# Patient Record
Sex: MALE | Race: BLACK OR AFRICAN AMERICAN | Employment: OTHER | URBAN - METROPOLITAN AREA
[De-identification: names, ages, dates, MRNs, and addresses within clinical notes are randomized per-mention and may not be internally consistent; named-entity substitution may affect disease eponyms.]

---

## 2024-05-07 ENCOUNTER — APPOINTMENT (OUTPATIENT)
Dept: CT IMAGING | Facility: HOSPITAL | Age: 74
End: 2024-05-07
Attending: EMERGENCY MEDICINE
Payer: MEDICARE

## 2024-05-07 ENCOUNTER — APPOINTMENT (OUTPATIENT)
Dept: GENERAL RADIOLOGY | Facility: HOSPITAL | Age: 74
End: 2024-05-07
Attending: EMERGENCY MEDICINE
Payer: MEDICARE

## 2024-05-07 ENCOUNTER — HOSPITAL ENCOUNTER (EMERGENCY)
Facility: HOSPITAL | Age: 74
Discharge: HOME | End: 2024-05-07
Attending: EMERGENCY MEDICINE
Payer: MEDICARE

## 2024-05-07 VITALS
TEMPERATURE: 98 F | SYSTOLIC BLOOD PRESSURE: 117 MMHG | DIASTOLIC BLOOD PRESSURE: 49 MMHG | HEART RATE: 58 BPM | OXYGEN SATURATION: 100 % | WEIGHT: 245 LBS | RESPIRATION RATE: 18 BRPM

## 2024-05-07 DIAGNOSIS — S83.92XA SPRAIN OF LEFT KNEE, UNSPECIFIED LIGAMENT, INITIAL ENCOUNTER: Primary | ICD-10-CM

## 2024-05-07 PROCEDURE — 20610 DRAIN/INJ JOINT/BURSA W/O US: CPT

## 2024-05-07 PROCEDURE — 99284 EMERGENCY DEPT VISIT MOD MDM: CPT

## 2024-05-07 PROCEDURE — 99283 EMERGENCY DEPT VISIT LOW MDM: CPT

## 2024-05-07 PROCEDURE — 96372 THER/PROPH/DIAG INJ SC/IM: CPT

## 2024-05-07 PROCEDURE — 73560 X-RAY EXAM OF KNEE 1 OR 2: CPT | Performed by: EMERGENCY MEDICINE

## 2024-05-07 PROCEDURE — 73700 CT LOWER EXTREMITY W/O DYE: CPT | Performed by: EMERGENCY MEDICINE

## 2024-05-07 RX ORDER — TRAMADOL HYDROCHLORIDE 50 MG/1
TABLET ORAL EVERY 6 HOURS PRN
Qty: 10 TABLET | Refills: 0 | Status: SHIPPED | OUTPATIENT
Start: 2024-05-07 | End: 2024-05-12

## 2024-05-07 RX ORDER — LIDOCAINE HCL/EPINEPHRINE/PF 2%-1:200K
VIAL (ML) INJECTION
Status: COMPLETED
Start: 2024-05-07 | End: 2024-05-07

## 2024-05-07 RX ORDER — LIDOCAINE HCL/EPINEPHRINE/PF 2%-1:200K
20 VIAL (ML) INJECTION ONCE
Status: COMPLETED | OUTPATIENT
Start: 2024-05-07 | End: 2024-05-07

## 2024-05-07 RX ORDER — MORPHINE SULFATE 4 MG/ML
4 INJECTION, SOLUTION INTRAMUSCULAR; INTRAVENOUS ONCE
Status: COMPLETED | OUTPATIENT
Start: 2024-05-07 | End: 2024-05-07

## 2024-05-07 NOTE — ED INITIAL ASSESSMENT (HPI)
Pt c/o left knee pain that started after fall on Wednesday night. Pt states that he did hit head but no LOC. Pt denies thinners and N/V.

## 2024-05-07 NOTE — ED QUICK NOTES
Pt to ed w/c of left knee pain r/t to fall last week. Pt reports going to a different ED where xrays were done but showed no fractures. The pt is now complaining of increasing pain \"even when the pants rub my knee I get severe pain\". Knee appears swollen, warm to touch. Pt reports severe pain.

## 2024-05-07 NOTE — ED PROVIDER NOTES
Patient Seen in: [facility] Emergency Department      History     Chief Complaint   Patient presents with    Knee Pain     Stated Complaint: Knee pain, fall last week (seen in ED) pain not managed with tylenol    Subjective:   HPI    Pt is 72 yo M who p/w left knee pain and swelling s/p fall that occurred last week. He states he was bending to pick something up when he lost his balance and fell forward, falling on left knee. Has been ambulating but with pain. No relief with tylenol.     Objective:   Past Medical History:    Diabetes (HCC)    Essential hypertension              Past Surgical History:   Procedure Laterality Date    Repair ing hernia,3+y/o,reducibl                  Social History     Socioeconomic History    Marital status:    Tobacco Use    Smoking status: Former     Types: Cigarettes    Smokeless tobacco: Never   Vaping Use    Vaping status: Never Used   Substance and Sexual Activity    Alcohol use: Not Currently    Drug use: Never              Review of Systems    Positive for stated complaint: Knee pain, fall last week (seen in ED) pain not managed with tylenol  Other systems are as noted in HPI.  Constitutional and vital signs reviewed.      All other systems reviewed and negative except as noted above.    Physical Exam     ED Triage Vitals [05/07/24 0257]   /72   Pulse 64   Resp 18   Temp 97.6 °F (36.4 °C)   Temp src Oral   SpO2 98 %   O2 Device None (Room air)       Current:/49   Pulse 58   Temp 97.6 °F (36.4 °C) (Oral)   Resp 18   Wt 111.1 kg   SpO2 100%         Physical Exam    GENERAL: No acute distress, awake and alert  HEENT: EOMI, PERRL  Neck: supple  CV: 2+ dp pulse, normal cap refill  Extremities: +effusion left knee with diffuse tenderness. No calf tenderness/compartments soft. No obvious deformity. Minimal ROM  Neuro: CN intact, normal speech, no focal deficits  SKIN: warm, dry, no rashes      ED Course   Labs Reviewed - No data to display         MDM         Medical Decision Making  Pt given IM morphine for pain    Attempt to aspirate fluid unsuccessful. Pt given 1% lidocaine with epi to lateral aspect of left knee but continued to have pain. No further attempt at needle aspiration by me.     D/w patient and family his results. Suspect knee sprain/strain. Immobilizer/pain meds/ortho f/u. They feel comfortable with discharge at this time    Amount and/or Complexity of Data Reviewed  Radiology: ordered.     Details: 2 views left knee    IMPRESSION:    No fractures or effusion.  Alignment is unremarkable.      Calcifications in the prepatellar soft tissues.  Arterial calcifications.      Noncontrast CT left knee    IMPRESSION:    Air within the joint probably related to recent arthrocentesis.  No significant effusion.  Probable chronic calcifications in the subcutaneous soft tissues in the prepatellar and infrapatellar soft tissues however there is mild to moderate subcutaneous edema which may be posttraumatic difficult to exclude cellulitis.    Arterial calcifications.    Case discussed with Dr. Hill at 5:38 AM ET    Catherine Crowe M.D.        Risk  OTC drugs.  Prescription drug management.  Parenteral controlled substances.        Disposition and Plan     Clinical Impression:  1. Sprain of left knee, unspecified ligament, initial encounter         Disposition:  Discharge  5/7/2024  4:55 am    Follow-up:  Kody Augustin MD  [address] [place] [address]  [phone]    Follow up      We recommend that you schedule follow up care with a primary care provider within the next three months to obtain basic health screening including reassessment of your blood pressure.      Medications Prescribed:  Current Discharge Medication List        START taking these medications    Details   traMADol 50 MG Oral Tab Take 1-2 tablets ( mg total) by mouth every 6 (six) hours as needed for Pain.  Qty: 10 tablet, Refills: 0    Associated Diagnoses:  Sprain of left knee, unspecified ligament, initial encounter

## 2025-04-17 ENCOUNTER — WALK IN (OUTPATIENT)
Dept: URGENT CARE | Age: 75
End: 2025-04-17

## 2025-04-17 ENCOUNTER — IMAGING SERVICES (OUTPATIENT)
Dept: GENERAL RADIOLOGY | Age: 75
End: 2025-04-17
Attending: NURSE PRACTITIONER

## 2025-04-17 VITALS
OXYGEN SATURATION: 98 % | SYSTOLIC BLOOD PRESSURE: 99 MMHG | HEIGHT: 68 IN | TEMPERATURE: 97 F | HEART RATE: 92 BPM | BODY MASS INDEX: 35.61 KG/M2 | WEIGHT: 235 LBS | RESPIRATION RATE: 17 BRPM | DIASTOLIC BLOOD PRESSURE: 62 MMHG

## 2025-04-17 DIAGNOSIS — J20.9 BRONCHITIS WITH BRONCHOSPASM: ICD-10-CM

## 2025-04-17 DIAGNOSIS — U07.1 COVID-19 VIRUS INFECTION: ICD-10-CM

## 2025-04-17 DIAGNOSIS — R05.1 ACUTE COUGH: Primary | ICD-10-CM

## 2025-04-17 DIAGNOSIS — R05.1 ACUTE COUGH: ICD-10-CM

## 2025-04-17 LAB
FLUAV RNA RESP QL NAA+PROBE: NOT DETECTED
FLUBV RNA RESP QL NAA+PROBE: NOT DETECTED
RSV AG NPH QL IA.RAPID: NOT DETECTED
SARS-COV-2 RNA RESP QL NAA+PROBE: DETECTED

## 2025-04-17 PROCEDURE — 71046 X-RAY EXAM CHEST 2 VIEWS: CPT | Performed by: RADIOLOGY

## 2025-04-17 RX ORDER — INSULIN GLARGINE 100 [IU]/ML
INJECTION, SOLUTION SUBCUTANEOUS
COMMUNITY
Start: 2025-01-09

## 2025-04-17 RX ORDER — INSULIN LISPRO 100 [IU]/ML
INJECTION, SOLUTION INTRAVENOUS; SUBCUTANEOUS
COMMUNITY
Start: 2025-02-26

## 2025-04-17 RX ORDER — OMEPRAZOLE 40 MG/1
CAPSULE, DELAYED RELEASE ORAL
COMMUNITY
Start: 2025-03-25

## 2025-04-17 RX ORDER — FINASTERIDE 5 MG/1
TABLET, FILM COATED ORAL
COMMUNITY
Start: 2025-04-04

## 2025-04-17 RX ORDER — SILDENAFIL 100 MG/1
TABLET, FILM COATED ORAL
COMMUNITY
Start: 2025-02-11

## 2025-04-17 RX ORDER — AZITHROMYCIN 250 MG/1
TABLET, FILM COATED ORAL
Qty: 6 TABLET | Refills: 0 | Status: SHIPPED | OUTPATIENT
Start: 2025-04-17

## 2025-04-17 RX ORDER — ALBUTEROL SULFATE 90 UG/1
INHALANT RESPIRATORY (INHALATION)
Qty: 1 EACH | Refills: 0 | Status: SHIPPED | OUTPATIENT
Start: 2025-04-17

## 2025-04-17 RX ORDER — SEMAGLUTIDE 0.68 MG/ML
INJECTION, SOLUTION SUBCUTANEOUS
COMMUNITY
Start: 2025-04-16

## 2025-04-17 RX ORDER — TRIAMTERENE AND HYDROCHLOROTHIAZIDE 37.5; 25 MG/1; MG/1
CAPSULE ORAL
COMMUNITY
Start: 2024-12-31

## 2025-04-17 RX ORDER — METOPROLOL SUCCINATE 100 MG/1
TABLET, EXTENDED RELEASE ORAL
COMMUNITY
Start: 2025-03-04

## 2025-04-17 RX ORDER — ERGOCALCIFEROL 1.25 MG/1
CAPSULE, LIQUID FILLED ORAL
COMMUNITY
Start: 2025-01-28

## 2025-04-17 RX ORDER — LATANOPROST 50 UG/ML
SOLUTION/ DROPS OPHTHALMIC
COMMUNITY
Start: 2024-12-31

## (undated) NOTE — LETTER
Date & Time: 5/7/2024, 4:55 AM  Patient: Jostin Mckeon  Encounter Provider(s):    Komal Hill MD       To Whom It May Concern:    Jostin Mckeon was seen and treated in our department on 5/7/2024. He cannot bear weight for next 1-2 weeks and will be unable to ambulate on cruise ship.     If you have any questions or concerns, please do not hesitate to call.        _____________________________  Physician/[facility] Signature